# Patient Record
Sex: FEMALE | Race: WHITE | Employment: FULL TIME
[De-identification: names, ages, dates, MRNs, and addresses within clinical notes are randomized per-mention and may not be internally consistent; named-entity substitution may affect disease eponyms.]

---

## 2017-11-26 ENCOUNTER — HEALTH MAINTENANCE LETTER (OUTPATIENT)
Age: 32
End: 2017-11-26

## 2020-10-10 ENCOUNTER — APPOINTMENT (OUTPATIENT)
Dept: GENERAL RADIOLOGY | Facility: HOSPITAL | Age: 35
End: 2020-10-10
Attending: PHYSICIAN ASSISTANT
Payer: COMMERCIAL

## 2020-10-10 ENCOUNTER — HOSPITAL ENCOUNTER (EMERGENCY)
Facility: HOSPITAL | Age: 35
Discharge: HOME | End: 2020-10-10
Attending: PHYSICIAN ASSISTANT | Admitting: PHYSICIAN ASSISTANT
Payer: COMMERCIAL

## 2020-10-10 VITALS
SYSTOLIC BLOOD PRESSURE: 131 MMHG | RESPIRATION RATE: 18 BRPM | OXYGEN SATURATION: 97 % | DIASTOLIC BLOOD PRESSURE: 86 MMHG | HEART RATE: 82 BPM | TEMPERATURE: 99.3 F

## 2020-10-10 DIAGNOSIS — M25.869 SYMPTOM OF MECHANICAL DISORDER OF KNEE: Primary | ICD-10-CM

## 2020-10-10 PROCEDURE — G0463 HOSPITAL OUTPT CLINIC VISIT: HCPCS

## 2020-10-10 PROCEDURE — 99213 OFFICE O/P EST LOW 20 MIN: CPT | Performed by: PHYSICIAN ASSISTANT

## 2020-10-10 PROCEDURE — 73562 X-RAY EXAM OF KNEE 3: CPT | Mod: RT

## 2020-10-10 ASSESSMENT — ENCOUNTER SYMPTOMS
CHILLS: 0
DIZZINESS: 0
WOUND: 0
MYALGIAS: 0
VOMITING: 0
LIGHT-HEADEDNESS: 0
ARTHRALGIAS: 1
COUGH: 0
NUMBNESS: 0
FATIGUE: 0
FEVER: 0
SHORTNESS OF BREATH: 0
HEADACHES: 0
DIARRHEA: 0
NAUSEA: 0

## 2020-10-10 NOTE — DISCHARGE INSTRUCTIONS
Thank you for allowing the urgent care to participate in your care. Please refer to your AVS for follow up and pain/symptoms management recommendations (I.e.: medications, helpful conservative treatment modalities, appropriate follow up if need to a specialist or family practice, etc.). Please return to urgent care if your symptoms change or worsen.     Discharge instructions:  -Follow up with PCP in 3-5 days  -If you were prescribed a medication(s), please take this as prescribed/directed  -Monitor your symptoms, if changing/worsening, return to UC/ER or PCP for follow up    Orthopedics referral placed. Recommend either [facility] or [facility] for follow up - location of your choosing    You will likely need an MRI to rule out meniscal damage.    For pain control - we recommend alternating Tylenol and Ibuprofen if you are able to take these medications. Alternate every 4 hours as needed. I.e.: Ibuprofen at 8am, Tylenol 12pm, Ibuprofen 4pm    -Daily maximum of Tylenol is 4000mg (recommend staying under 3000mg)   -Daily maximum of Ibuprofen is 1200mg (take no more than six 200mg pills a day)

## 2020-10-10 NOTE — ED AVS SNAPSHOT
[facility]  [address]  [place] [address]  Phone: [phone]                                    Akosua Durham   MRN: [id number]    Department: [facility]   Date of Visit: 10/10/2020           After Visit Summary Signature Page    I have received my discharge instructions, and my questions have been answered. I have discussed any challenges I see with this plan with the nurse or doctor.    ..........................................................................................................................................  Patient/Patient Representative Signature      ..........................................................................................................................................  Patient Representative Print Name and Relationship to Patient    ..................................................               ................................................  Date                                   Time    ..........................................................................................................................................  Reviewed by Signature/Title    ...................................................              ..............................................  Date                                               Time          22EPIC Rev 08/18

## 2020-10-10 NOTE — ED PROVIDER NOTES
History     Chief Complaint   Patient presents with     Knee Pain     right     HPI  Akosua Durham is a 35 year old female who was urgent care today with a chief complaint of right knee pain.  She was at jujitsu class and was trying to perform a sweeping motion with her right leg and felt multiple pops inside the knee.  She states that her ankle went medially and her knee went laterally when the pops occurred.  She has not tried to ambulate since the injury.  This is her second episode of knee pain and injury, the first time she feels well without needing orthopedic or PCP evaluation.  She denies numbness tingling or burning.  She denies shortness of breath, lightheadedness/loss of consciousness and/or chest pain.  She is tried an ice pack underneath her knee which helped initially.  She has not tried any OTC analgesics as she came straight to urgent care after the injury.  She denies any fractures or surgeries to the right knee.  She has no other symptoms to report.    Her medical, surgical and allergy list are all up-to-date.    Allergies:  No Known Allergies    Problem List:    There are no active problems to display for this patient.       Past Medical History:    No past medical history on file.    Past Surgical History:    No past surgical history on file.    Family History:    No family history on file.    Social History:  Marital Status:  Single [1]  Social History     Tobacco Use     Smoking status: Never Smoker     Smokeless tobacco: Never Used   Substance Use Topics     Alcohol use: No     Drug use: No        Medications:         NO ACTIVE MEDICATIONS      Review of Systems   Constitutional: Negative for chills, fatigue and fever.   Respiratory: Negative for cough and shortness of breath.    Gastrointestinal: Negative for diarrhea, nausea and vomiting.   Musculoskeletal: Positive for arthralgias (right knee). Negative for myalgias.   Skin: Negative for rash and wound.   Neurological: Negative for  dizziness, light-headedness, numbness and headaches.   All other systems reviewed and are negative.      Physical Exam   BP: 131/86  Pulse: 82  Temp: 99.3  F (37.4  C)  Resp: 18  SpO2: 97 %      Physical Exam  Vitals signs and nursing note reviewed.   Constitutional:       Comments: Patient in wheelchair throughout entire examination.   HENT:      Head: Normocephalic and atraumatic.   Eyes:      Conjunctiva/sclera: Conjunctivae normal.   Cardiovascular:      Rate and Rhythm: Normal rate and regular rhythm.      Pulses: Normal pulses.      Heart sounds: Normal heart sounds.   Pulmonary:      Effort: Pulmonary effort is normal.      Breath sounds: Normal breath sounds.   Skin:     General: Skin is warm and dry.      Capillary Refill: Capillary refill takes less than 2 seconds.   Neurological:      General: No focal deficit present.      Mental Status: She is alert and oriented to person, place, and time.   Psychiatric:         Mood and Affect: Mood normal.         Behavior: Behavior normal.         Thought Content: Thought content normal.         Judgment: Judgment normal.       Gait: normal gait with no signs of guarding or compensation. Patient is able to get up and go from a seated position, in order to ambulate.    Appearance of the RIGHT knee: Skin is clean, dry, and non-ecchymotic. Effusion trace. TTP VMO distally.  Patellar tracking is normal. ROM: flexion 120, extension 0. Stable to varus, valgus, Lachman, A&P drawer ligamentous stressing. Marin negative . Strength 4/5 extension strength. Neurovascular status, distal pulses and sensation intact.    Appearance of the LEFT knee: Skin is clean, dry, and non-ecchymotic. Effusion none. TTP none.  Patellar tracking is normal. ROM: flexion 135, extension 0.  Stable to varus, valgus, Lachman, A&P drawer ligamentous stressing. Marin negative . Strength 5/5 extension. Neurovascular status, distal pulses and sensation intact.    Ankle ROM: normal ankle range of  motion bilaterally in dorsiflexion, plantarflexion, inversion and eversion.  Dorsalis pedis and posterior tibial pulses are 2+ bilaterally.    ED Course        Procedures               Results for orders placed or performed during the hospital encounter of 10/10/20 (from the past 24 hour(s))   XR Knee Right 3 Views    Narrative    PROCEDURE:  XR KNEE RT 3 VW    HISTORY: rule out tibial plateau fx.    COMPARISON:  None.    TECHNIQUE:  3 views right knee.    FINDINGS:  No fracture or dislocation is identified. The joint spaces  are preserved. No foreign body is seen.       Impression    IMPRESSION: No acute fracture.      MINISTERIO HUGHES MD       Medications - No data to display    Assessments & Plan (with Medical Decision Making)     I have reviewed the nursing notes.    I have reviewed the findings, diagnosis, plan and need for follow up with the patient.  New Prescriptions    No medications on file       Final diagnoses:   Symptom of mechanical disorder of knee   Patient is a pleasant, active 35-year-old female in mild distress.  She presented to urgent care today after a jujitsu injury causing her knee to go into a varus stress in her ankle into valgus stress.  Her physical exam is available for review above.  Her vital signs are stable throughout her entirety of her urgent care stay.  An x-ray was ordered to rule out occult fracture primarily of the tibial plateaus.  The x-ray showed no fracture or dislocation, no effusion.  Cannot rule out internal derangement including meniscal pathology or ligamentous tear.  Recommended rest, ice, compression and elevation as well as alternating Tylenol and ibuprofen as needed for pain control.  Daily limits of Tylenol and ibuprofen reviewed with patient.  If her pain continues and episodes of instability present, I encouraged her to follow-up with orthopedics or her PCP.  All questions and concerns were answered to patient's satisfaction.  She is in agreement and  understanding with above treatment plan.  She was stable to discharge home, orthopedic referral was placed as well.    Tina Pérez PA-C  10/10/2020   [facility]

## 2020-10-10 NOTE — ED TRIAGE NOTES
Pt presents today with c/o right knee pain. Pt was doing jujitsu today when her partner had pulled on her ankle in one direction while the pt was going the other direction. Pt heard a pop, this is second time knee had popped in last month for pt. Pt was not originally seen for first time.

## 2020-10-10 NOTE — ED TRIAGE NOTES
Pt in for complaints of right knee pain. Report she was practicing [employer] today and her ankle went one way and the knee the other. Ice applied post injury. Able to move with some pain.